# Patient Record
Sex: MALE | Race: WHITE | NOT HISPANIC OR LATINO | Employment: FULL TIME | URBAN - METROPOLITAN AREA
[De-identification: names, ages, dates, MRNs, and addresses within clinical notes are randomized per-mention and may not be internally consistent; named-entity substitution may affect disease eponyms.]

---

## 2017-01-23 ENCOUNTER — HOSPITAL ENCOUNTER (OUTPATIENT)
Dept: PAIN MANAGEMENT | Facility: REHABILITATION | Age: 27
End: 2017-01-23
Attending: ANESTHESIOLOGY
Payer: COMMERCIAL

## 2017-01-23 ENCOUNTER — HOSPITAL ENCOUNTER (OUTPATIENT)
Dept: RADIOLOGY | Facility: REHABILITATION | Age: 27
End: 2017-01-23
Attending: ANESTHESIOLOGY
Payer: COMMERCIAL

## 2017-01-23 VITALS
WEIGHT: 215.83 LBS | DIASTOLIC BLOOD PRESSURE: 97 MMHG | SYSTOLIC BLOOD PRESSURE: 142 MMHG | TEMPERATURE: 97.6 F | OXYGEN SATURATION: 95 % | HEART RATE: 68 BPM | BODY MASS INDEX: 30.22 KG/M2 | HEIGHT: 71 IN | RESPIRATION RATE: 18 BRPM

## 2017-01-23 PROCEDURE — 700111 HCHG RX REV CODE 636 W/ 250 OVERRIDE (IP)

## 2017-01-23 PROCEDURE — 64483 NJX AA&/STRD TFRM EPI L/S 1: CPT

## 2017-01-23 RX ORDER — IBUPROFEN 200 MG
200 TABLET ORAL EVERY 6 HOURS PRN
COMMUNITY
End: 2022-06-23

## 2017-01-23 RX ORDER — MIDAZOLAM HYDROCHLORIDE 1 MG/ML
INJECTION INTRAMUSCULAR; INTRAVENOUS
Status: COMPLETED
Start: 2017-01-23 | End: 2017-01-23

## 2017-01-23 RX ORDER — BUPIVACAINE HYDROCHLORIDE 2.5 MG/ML
INJECTION, SOLUTION EPIDURAL; INFILTRATION; INTRACAUDAL
Status: COMPLETED
Start: 2017-01-23 | End: 2017-01-23

## 2017-01-23 RX ORDER — DEXAMETHASONE SODIUM PHOSPHATE 10 MG/ML
INJECTION, SOLUTION INTRAMUSCULAR; INTRAVENOUS
Status: COMPLETED
Start: 2017-01-23 | End: 2017-01-23

## 2017-01-23 RX ORDER — NAPROXEN 250 MG/1
250 TABLET ORAL 2 TIMES DAILY WITH MEALS
COMMUNITY
End: 2022-06-23

## 2017-01-23 RX ADMIN — BUPIVACAINE HYDROCHLORIDE 2 ML: 2.5 INJECTION, SOLUTION EPIDURAL; INFILTRATION; INTRACAUDAL; PERINEURAL at 10:26

## 2017-01-23 RX ADMIN — DEXAMETHASONE SODIUM PHOSPHATE 10 MG: 10 INJECTION, SOLUTION INTRAMUSCULAR; INTRAVENOUS at 10:26

## 2017-01-23 ASSESSMENT — PAIN SCALES - GENERAL
PAINLEVEL_OUTOF10: 5
PAINLEVEL_OUTOF10: 8
PAINLEVEL_OUTOF10: 5

## 2017-01-23 NOTE — PROGRESS NOTES
Current meds. See medication reconciliation form. Reviewed with pt. Pt denies taking ASA,other blood thinners or anti-inflammatories. Pt has a ride post-procedure (mother, Promise is  if neccessary) otherwise pt is driving self. Printed and verbal discharge instructions given to pt who verbalized understanding.dr. Olguin made aware pre-procedure.

## 2018-12-03 ENCOUNTER — HOSPITAL ENCOUNTER (OUTPATIENT)
Dept: RADIOLOGY | Facility: MEDICAL CENTER | Age: 28
End: 2018-12-03
Attending: ANESTHESIOLOGY
Payer: COMMERCIAL

## 2018-12-03 DIAGNOSIS — M54.2 CERVICALGIA: ICD-10-CM

## 2018-12-03 DIAGNOSIS — M54.50 ACUTE LOW BACK PAIN WITHOUT SCIATICA, UNSPECIFIED BACK PAIN LATERALITY: ICD-10-CM

## 2018-12-03 PROCEDURE — 72040 X-RAY EXAM NECK SPINE 2-3 VW: CPT

## 2018-12-03 PROCEDURE — 72141 MRI NECK SPINE W/O DYE: CPT

## 2018-12-03 PROCEDURE — 72148 MRI LUMBAR SPINE W/O DYE: CPT

## 2018-12-03 PROCEDURE — 72100 X-RAY EXAM L-S SPINE 2/3 VWS: CPT

## 2019-06-17 ENCOUNTER — HOSPITAL ENCOUNTER (OUTPATIENT)
Facility: MEDICAL CENTER | Age: 29
End: 2019-06-17
Attending: PHYSICIAN ASSISTANT
Payer: COMMERCIAL

## 2019-06-17 ENCOUNTER — OFFICE VISIT (OUTPATIENT)
Dept: URGENT CARE | Facility: PHYSICIAN GROUP | Age: 29
End: 2019-06-17
Payer: COMMERCIAL

## 2019-06-17 VITALS
RESPIRATION RATE: 16 BRPM | WEIGHT: 220 LBS | DIASTOLIC BLOOD PRESSURE: 70 MMHG | SYSTOLIC BLOOD PRESSURE: 124 MMHG | OXYGEN SATURATION: 98 % | BODY MASS INDEX: 29.16 KG/M2 | TEMPERATURE: 97.1 F | HEIGHT: 73 IN | HEART RATE: 74 BPM
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
## 2019-06-17 DIAGNOSIS — B00.1 COLD SORE: ICD-10-CM

## 2019-06-17 DIAGNOSIS — K13.0 LESION OF LIP: Primary | ICD-10-CM

## 2019-06-17 PROCEDURE — 99203 OFFICE O/P NEW LOW 30 MIN: CPT | Mod: 25 | Performed by: PHYSICIAN ASSISTANT

## 2019-06-17 PROCEDURE — 87529 HSV DNA AMP PROBE: CPT | Mod: 91

## 2019-06-17 PROCEDURE — 90471 IMMUNIZATION ADMIN: CPT | Performed by: PHYSICIAN ASSISTANT

## 2019-06-17 PROCEDURE — 90715 TDAP VACCINE 7 YRS/> IM: CPT | Performed by: PHYSICIAN ASSISTANT

## 2019-06-17 RX ORDER — CELECOXIB 200 MG/1
200 CAPSULE ORAL
Refills: 5 | COMMUNITY
Start: 2019-04-28

## 2019-06-17 RX ORDER — BUPRENORPHINE 10 UG/H
PATCH TRANSDERMAL
Refills: 1 | COMMUNITY
Start: 2019-05-28 | End: 2022-06-23

## 2019-06-17 RX ORDER — VALACYCLOVIR HYDROCHLORIDE 1 G/1
1000 TABLET, FILM COATED ORAL 2 TIMES DAILY
Qty: 20 TAB | Refills: 0 | Status: SHIPPED | OUTPATIENT
Start: 2019-06-17 | End: 2022-06-23

## 2019-06-18 ASSESSMENT — ENCOUNTER SYMPTOMS
LIP LACERATION: 1
MYALGIAS: 0
ARTHRALGIAS: 0
FEVER: 0
SWOLLEN GLANDS: 1
HEADACHES: 0

## 2019-06-19 NOTE — PROGRESS NOTES
Subjective:      Silas Mckeon is a 28 y.o. male who presents with Hand Swelling (Lt hand animal bite  x3.5weeks ) and Lip Swelling (Upper lip wound/pus x3days )    PMH:  has a past medical history of Pain.  MEDS:   Current Outpatient Prescriptions:   •  celecoxib (CELEBREX) 200 MG Cap, Take 200 mg by mouth., Disp: , Rfl: 5  •  Buprenorphine 10 MCG/HR PATCH WEEKLY, APPLY 1 PATCH(ES) EVERY WEEK BY TRANSDERMAL ROUTE AS DIRECTED FOR 28 DAYS., Disp: , Rfl: 1  •  valacyclovir (VALTREX) 1 GM Tab, Take 1 Tab by mouth 2 times a day., Disp: 20 Tab, Rfl: 0  •  ibuprofen (MOTRIN) 200 MG Tab, Take 200 mg by mouth every 6 hours as needed., Disp: , Rfl:   •  naproxen (NAPROSYN) 250 MG Tab, Take 250 mg by mouth 2 times a day, with meals., Disp: , Rfl:   ALLERGIES: No Known Allergies  SURGHX:   Past Surgical History:   Procedure Laterality Date   • PB INJ,FORAMEN,L/S,1 LEVEL Bilateral 10/14/2015    Procedure: INJ-FORAMEN EPI LUM/SAC SNGL - L5-S1;  Surgeon: Chester Olguin M.D.;  [facility];  Service: Pain Management   • PB INJ,FORAMEN,L/S,1 LEVEL  10/14/2015    Procedure: INJ-FORAMEN EPI LUM/SAC SNGL;  Surgeon: Chester Olguin M.D.;  Location: [facility];  Service: Pain Management   • OTHER ORTHOPEDIC SURGERY      hip surgery 2010     SOCHX:  reports that he quit smoking about 4 years ago. He has never used smokeless tobacco. He reports that he drinks alcohol. He reports that he does not use drugs.  FH: Reviewed with patient, not pertinent to this visit.           Pt here for 2 concerns today:    First complaint, pt is requesting a Tdap due to being bitten by a pet sugar squirrel 3 weeks ago. Pt keeps squirrel as a pet.  He was holding the squirrel and it bit him because it felt threatened by the patient's dogs.  Pt states the bite is completely healed and was never infected, he just wanted to be sure his Tdap was updated, he cant remember his las Tdap     Second complaint: pt has blister  "on his upper lip that is tender and swollen x 3 days,  Pt has not had one before. Pt would like it tested for herpes.       Animal Bite   This is a new problem. Episode onset: 3 weeks. The problem has been resolved. Associated symptoms include swollen glands. Pertinent negatives include no arthralgias, fever, headaches, myalgias or rash. Nothing aggravates the symptoms. He has tried nothing for the symptoms. The treatment provided no relief.   Oral Pain   This is a new problem. The current episode started yesterday. The problem occurs constantly. The problem has been gradually worsening. Associated symptoms include swollen glands. Pertinent negatives include no arthralgias, fever, headaches, myalgias or rash. The symptoms are aggravated by drinking and eating. He has tried nothing for the symptoms. The treatment provided no relief.       Review of Systems   Constitutional: Negative for fever.   Musculoskeletal: Negative for arthralgias and myalgias.   Skin: Negative for rash.   Neurological: Negative for headaches.   All other systems reviewed and are negative.         Objective:     /70   Pulse 74   Temp 36.2 °C (97.1 °F) (Temporal)   Resp 16   Ht 1.854 m (6' 1\")   Wt 99.8 kg (220 lb)   SpO2 98%   BMI 29.03 kg/m²      Physical Exam   Constitutional: He is oriented to person, place, and time. He appears well-developed and well-nourished. No distress.   HENT:   Head: Normocephalic and atraumatic.   Right Ear: Tympanic membrane normal.   Left Ear: Tympanic membrane normal.   Nose: Nose normal.   Mouth/Throat: Uvula is midline, oropharynx is clear and moist and mucous membranes are normal. Oral lesions present. No posterior oropharyngeal erythema.       Eyes: Pupils are equal, round, and reactive to light. Conjunctivae and EOM are normal.   Neck: Normal range of motion. Neck supple.   Cardiovascular: Normal rate, regular rhythm and normal heart sounds.    Pulmonary/Chest: Effort normal and breath sounds " normal.   Abdominal: Soft.   Musculoskeletal: Normal range of motion.        Left hand: He exhibits no tenderness, normal two-point discrimination and no laceration. Normal sensation noted. Normal strength noted.        Hands:  Neurological: He is alert and oriented to person, place, and time. Gait normal.   Skin: Skin is warm and dry. Capillary refill takes less than 2 seconds.   Psychiatric: He has a normal mood and affect.   Nursing note and vitals reviewed.              Assessment/Plan:     1. Lesion of lip  HSV 1/2 PCR    valacyclovir (VALTREX) 1 GM Tab   2. Cold sore  valacyclovir (VALTREX) 1 GM Tab   3. Wound due to squirrel bite  Tdap =>6yo IM    valacyclovir (VALTREX) 1 GM Tab     Oral lesion cultured to test for HSV.     Tdap ordered for patient.      PT should follow up with PCP in 1-2 days for re-evaluation if symptoms have not improved.  Discussed red flags and reasons to return to UC or ED.  Pt and/or family verbalized understanding of diagnosis and follow up instructions and was offered informational handout on diagnosis.  PT discharged.

## 2019-06-21 LAB
HSV1 DNA SPEC QL NAA+PROBE: POSITIVE
HSV2 DNA SPEC QL NAA+PROBE: NEGATIVE
SPECIMEN SOURCE: ABNORMAL

## 2019-12-03 ENCOUNTER — OFFICE VISIT (OUTPATIENT)
Dept: URGENT CARE | Facility: PHYSICIAN GROUP | Age: 29
End: 2019-12-03
Payer: COMMERCIAL

## 2019-12-03 VITALS
BODY MASS INDEX: 30.27 KG/M2 | TEMPERATURE: 98.4 F | HEART RATE: 68 BPM | HEIGHT: 73 IN | WEIGHT: 228.4 LBS | DIASTOLIC BLOOD PRESSURE: 90 MMHG | SYSTOLIC BLOOD PRESSURE: 130 MMHG | OXYGEN SATURATION: 98 % | RESPIRATION RATE: 18 BRPM

## 2019-12-03 DIAGNOSIS — J20.8 ACUTE BRONCHITIS DUE TO OTHER SPECIFIED ORGANISMS: ICD-10-CM

## 2019-12-03 PROCEDURE — 99214 OFFICE O/P EST MOD 30 MIN: CPT | Performed by: NURSE PRACTITIONER

## 2019-12-03 RX ORDER — METHYLPREDNISOLONE 4 MG/1
TABLET ORAL
Qty: 21 TAB | Refills: 0 | Status: SHIPPED | OUTPATIENT
Start: 2019-12-03 | End: 2022-06-23

## 2019-12-03 RX ORDER — ALBUTEROL SULFATE 90 UG/1
2 AEROSOL, METERED RESPIRATORY (INHALATION) EVERY 6 HOURS PRN
Qty: 8.5 G | Refills: 0 | Status: SHIPPED | OUTPATIENT
Start: 2019-12-03 | End: 2022-06-23

## 2019-12-03 ASSESSMENT — ENCOUNTER SYMPTOMS
SORE THROAT: 0
EYE DISCHARGE: 0
CHILLS: 0
SHORTNESS OF BREATH: 0
COUGH: 1
FEVER: 0
DIARRHEA: 0
WHEEZING: 1
SPUTUM PRODUCTION: 1
MYALGIAS: 1
ORTHOPNEA: 0
HEADACHES: 1
NAUSEA: 0

## 2019-12-03 NOTE — PROGRESS NOTES
"Subjective:      Silas Mckeon is a 28 y.o. male who presents with Cough (fluid in lungs, sweats, fever, headache, )            HPI New. 28 year old with cough, \"fluid on lungs\", sweats and headache. Denies sore throat, nausea or diarrhea. States cough that is productive, no bloody sputum or chest pain/back pain. Denies shortness of breath but has some wheezing. He has been taking dayquil for his symptoms.  Patient has no known allergies.  Current Outpatient Medications on File Prior to Visit   Medication Sig Dispense Refill   • celecoxib (CELEBREX) 200 MG Cap Take 200 mg by mouth.  5   • Buprenorphine 10 MCG/HR PATCH WEEKLY APPLY 1 PATCH(ES) EVERY WEEK BY TRANSDERMAL ROUTE AS DIRECTED FOR 28 DAYS.  1   • ibuprofen (MOTRIN) 200 MG Tab Take 200 mg by mouth every 6 hours as needed.     • valacyclovir (VALTREX) 1 GM Tab Take 1 Tab by mouth 2 times a day. (Patient not taking: Reported on 12/3/2019) 20 Tab 0   • naproxen (NAPROSYN) 250 MG Tab Take 250 mg by mouth 2 times a day, with meals.       No current facility-administered medications on file prior to visit.      Social History     Socioeconomic History   • Marital status: Single     Spouse name: Not on file   • Number of children: Not on file   • Years of education: Not on file   • Highest education level: Not on file   Occupational History   • Not on file   Social Needs   • Financial resource strain: Not on file   • Food insecurity:     Worry: Not on file     Inability: Not on file   • Transportation needs:     Medical: Not on file     Non-medical: Not on file   Tobacco Use   • Smoking status: Former Smoker     Last attempt to quit: 2015     Years since quittin.6   • Smokeless tobacco: Never Used   Substance and Sexual Activity   • Alcohol use: Yes     Comment: occassion   • Drug use: No   • Sexual activity: Not on file   Lifestyle   • Physical activity:     Days per week: Not on file     Minutes per session: Not on file   • Stress: Not on file " "  Relationships   • Social connections:     Talks on phone: Not on file     Gets together: Not on file     Attends Sabianism service: Not on file     Active member of club or organization: Not on file     Attends meetings of clubs or organizations: Not on file     Relationship status: Not on file   • Intimate partner violence:     Fear of current or ex partner: Not on file     Emotionally abused: Not on file     Physically abused: Not on file     Forced sexual activity: Not on file   Other Topics Concern   • Not on file   Social History Narrative   • Not on file     Breast Cancer-related family history is not on file.      Review of Systems   Constitutional: Positive for malaise/fatigue. Negative for chills and fever.   HENT: Positive for congestion. Negative for sore throat.    Eyes: Negative for discharge.   Respiratory: Positive for cough, sputum production and wheezing. Negative for shortness of breath.    Cardiovascular: Negative for chest pain and orthopnea.   Gastrointestinal: Negative for diarrhea and nausea.   Musculoskeletal: Positive for myalgias.   Neurological: Positive for headaches.   Endo/Heme/Allergies: Negative for environmental allergies.          Objective:     /90 (BP Location: Right arm, Patient Position: Sitting, BP Cuff Size: Adult)   Pulse 68   Temp 36.9 °C (98.4 °F) (Temporal)   Resp 18   Ht 1.854 m (6' 1\")   Wt 103.6 kg (228 lb 6.4 oz)   SpO2 98%   BMI 30.13 kg/m²      Physical Exam  Vitals signs and nursing note reviewed.   Constitutional:       General: He is not in acute distress.     Appearance: He is well-developed.   HENT:      Head: Normocephalic and atraumatic.      Right Ear: Ear canal and external ear normal. No middle ear effusion. Tympanic membrane is not injected or perforated.      Left Ear: Ear canal and external ear normal.  No middle ear effusion. Tympanic membrane is not injected or perforated.      Nose: Mucosal edema present.      Mouth/Throat:      " Pharynx: Posterior oropharyngeal erythema present. No oropharyngeal exudate.   Eyes:      General:         Right eye: No discharge.         Left eye: No discharge.      Conjunctiva/sclera: Conjunctivae normal.   Neck:      Musculoskeletal: Normal range of motion and neck supple.   Cardiovascular:      Rate and Rhythm: Normal rate and regular rhythm.      Heart sounds: Normal heart sounds. No murmur.   Pulmonary:      Effort: Pulmonary effort is normal. No respiratory distress.      Breath sounds: Normal breath sounds.   Musculoskeletal: Normal range of motion.      Comments: Normal movement of all 4 extremities.   Lymphadenopathy:      Cervical: No cervical adenopathy.      Upper Body:      Right upper body: No supraclavicular adenopathy.      Left upper body: No supraclavicular adenopathy.   Skin:     General: Skin is warm and dry.   Neurological:      Mental Status: He is alert and oriented to person, place, and time.      Gait: Gait normal.   Psychiatric:         Behavior: Behavior normal.         Thought Content: Thought content normal.                 Assessment/Plan:     1. Acute bronchitis due to other specified organisms  albuterol 108 (90 Base) MCG/ACT Aero Soln inhalation aerosol    methylPREDNISolone (MEDROL DOSEPAK) 4 MG Tablet Therapy Pack     Viral illness at this time with no indication for antibiotics. Reviewed with patient expected course of illness and also reviewed OTC medications that may be used for symptom relief. Follow up 7-10 days if not improving.

## 2022-06-23 ENCOUNTER — OFFICE VISIT (OUTPATIENT)
Dept: URGENT CARE | Facility: PHYSICIAN GROUP | Age: 32
End: 2022-06-23
Payer: COMMERCIAL

## 2022-06-23 VITALS
DIASTOLIC BLOOD PRESSURE: 90 MMHG | BODY MASS INDEX: 32.87 KG/M2 | WEIGHT: 248 LBS | OXYGEN SATURATION: 97 % | RESPIRATION RATE: 18 BRPM | HEIGHT: 73 IN | HEART RATE: 77 BPM | SYSTOLIC BLOOD PRESSURE: 136 MMHG | TEMPERATURE: 98.4 F

## 2022-06-23 DIAGNOSIS — H60.332 ACUTE SWIMMER'S EAR OF LEFT SIDE: ICD-10-CM

## 2022-06-23 DIAGNOSIS — H61.22 HEARING LOSS OF LEFT EAR DUE TO CERUMEN IMPACTION: ICD-10-CM

## 2022-06-23 PROCEDURE — 99213 OFFICE O/P EST LOW 20 MIN: CPT | Performed by: PHYSICIAN ASSISTANT

## 2022-06-23 RX ORDER — OFLOXACIN 3 MG/ML
5 SOLUTION AURICULAR (OTIC) DAILY
Qty: 7 ML | Refills: 0 | Status: SHIPPED | OUTPATIENT
Start: 2022-06-23 | End: 2022-06-30

## 2022-06-23 ASSESSMENT — ENCOUNTER SYMPTOMS
CHILLS: 0
FEVER: 0

## 2022-06-23 NOTE — PROGRESS NOTES
Subjective:   Silas Mckeon is a 31 y.o. male who presents today with   Chief Complaint   Patient presents with   • Ear Fullness     Water went in the ears while swimming. Left side muffled and ringing      Ear Fullness  This is a new problem. The current episode started yesterday. The problem occurs constantly. The problem has been unchanged. Pertinent negatives include no chills or fever. Treatments tried: Peroxide, drops, water. The treatment provided no relief.     Patient states he went swimming yesterday and noticed some muffled sounds and ear fullness to the left ear afterwards.  PMH:  has a past medical history of Pain.  MEDS:   Current Outpatient Medications:   •  ofloxacin otic sol (FLOXIN OTIC) 0.3 % Solution, Administer 5 Drops into affected ear(s) every day for 7 days., Disp: 7 mL, Rfl: 0  •  celecoxib (CELEBREX) 200 MG Cap, Take 200 mg by mouth., Disp: , Rfl: 5  ALLERGIES: No Known Allergies  SURGHX:   Past Surgical History:   Procedure Laterality Date   • [place] NJX AA&/STRD TFRML EPI LUMBAR/SACRAL 1 LEVEL Bilateral 10/14/2015    Procedure: INJ-FORAMEN EPI LUM/SAC SNGL - L5-S1;  Surgeon: Chester Olguin M.D.;  Location: [facility];  Service: Pain Management   • NH NJX AA&/STRD TFRML EPI LUMBAR/SACRAL 1 LEVEL  10/14/2015    Procedure: INJ-FORAMEN EPI LUM/SAC SNGL;  Surgeon: Chester Olguin M.D.;  Location: SURGERY SURG[facility];  Service: Pain Management   • OTHER ORTHOPEDIC SURGERY      hip surgery 2010     SOCHX:  reports that he quit smoking about 7 years ago. He has never used smokeless tobacco. He reports current alcohol use. He reports that he does not use drugs.  FH: Reviewed with patient, not pertinent to this visit.     Review of Systems   Constitutional: Negative for chills and fever.   HENT:        Left ear fullness      Objective:   BP (!) 136/90 (BP Location: Right arm, Patient Position: Sitting, BP Cuff Size: Adult)   Pulse 77   Temp 36.9 °C (98.4 °F) (Temporal)    "Resp 18   Ht 1.854 m (6' 1\")   Wt 112 kg (248 lb)   SpO2 97%   BMI 32.72 kg/m²   Physical Exam  Vitals and nursing note reviewed.   Constitutional:       General: He is not in acute distress.     Appearance: Normal appearance. He is well-developed. He is not ill-appearing or toxic-appearing.   HENT:      Head: Normocephalic and atraumatic.      Right Ear: Hearing, tympanic membrane and ear canal normal.      Left Ear: Hearing normal. No drainage, swelling or tenderness. There is impacted cerumen.   Cardiovascular:      Rate and Rhythm: Normal rate.   Pulmonary:      Effort: Pulmonary effort is normal.   Musculoskeletal:      Comments: Normal movement in all 4 extremities   Skin:     General: Skin is warm and dry.   Neurological:      Mental Status: He is alert.      Coordination: Coordination normal.   Psychiatric:         Mood and Affect: Mood normal.       Assessment/Plan:   Assessment    1. Hearing loss of left ear due to cerumen impaction    2. Acute swimmer's ear of left side  - ofloxacin otic sol (FLOXIN OTIC) 0.3 % Solution; Administer 5 Drops into affected ear(s) every day for 7 days.  Dispense: 7 mL; Refill: 0  Patient tolerated ear lavage with successful removal of large amounts of cerumen from the left ear.  Was able to visualize the TM following ear lavage and no signs of perforation but the eardrum was inflamed as was the ear canal.   We will treat with antibiotic eardrops at this time and recommend patient avoiding any water such as from swimming while being treated.  Differential diagnosis, natural history, supportive care, and indications for immediate follow-up discussed.   Patient given instructions and understanding of medications and treatment.    If not improving in 3-5 days, F/U with PCP or return to  if symptoms worsen.    Patient agreeable to plan.      Please note that this dictation was created using voice recognition software. I have made every reasonable attempt to correct obvious " errors, but I expect that there are errors of grammar and possibly content that I did not discover before finalizing the note.    Shai Faith PA-C

## 2022-09-16 ENCOUNTER — HOSPITAL ENCOUNTER (OUTPATIENT)
Dept: RADIOLOGY | Facility: MEDICAL CENTER | Age: 32
End: 2022-09-16
Payer: COMMERCIAL

## 2022-09-16 ENCOUNTER — OFFICE VISIT (OUTPATIENT)
Dept: URGENT CARE | Facility: PHYSICIAN GROUP | Age: 32
End: 2022-09-16
Payer: COMMERCIAL

## 2022-09-16 VITALS
WEIGHT: 235 LBS | TEMPERATURE: 97.2 F | SYSTOLIC BLOOD PRESSURE: 136 MMHG | HEIGHT: 73 IN | BODY MASS INDEX: 31.14 KG/M2 | RESPIRATION RATE: 16 BRPM | OXYGEN SATURATION: 98 % | HEART RATE: 112 BPM | DIASTOLIC BLOOD PRESSURE: 78 MMHG

## 2022-09-16 DIAGNOSIS — R06.02 SHORTNESS OF BREATH: ICD-10-CM

## 2022-09-16 DIAGNOSIS — R06.83 SNORING: ICD-10-CM

## 2022-09-16 DIAGNOSIS — F41.9 ANXIETY: ICD-10-CM

## 2022-09-16 PROCEDURE — 99214 OFFICE O/P EST MOD 30 MIN: CPT

## 2022-09-16 PROCEDURE — 71046 X-RAY EXAM CHEST 2 VIEWS: CPT

## 2022-09-16 PROCEDURE — 93000 ELECTROCARDIOGRAM COMPLETE: CPT

## 2022-09-16 RX ORDER — HYDROXYZINE HYDROCHLORIDE 25 MG/1
25 TABLET, FILM COATED ORAL
Qty: 30 TABLET | Refills: 1 | Status: SHIPPED | OUTPATIENT
Start: 2022-09-16 | End: 2022-10-16

## 2022-09-16 RX ORDER — ALBUTEROL SULFATE 90 UG/1
2 AEROSOL, METERED RESPIRATORY (INHALATION) EVERY 6 HOURS PRN
Qty: 8.5 G | Refills: 0 | Status: SHIPPED | OUTPATIENT
Start: 2022-09-16 | End: 2023-07-26

## 2022-09-16 NOTE — PROGRESS NOTES
"Subjective:   Silas Mckeon is a 31 y.o. male who presents for Anxiety (Woke up today in a panic attack, heart racing, pt thinks he has sleep apnea. )      HPI:  Presents with his SO with complaints of nocturnal awakening, racing heart, anxiety attacks.   Reports \"racing heart sensation for years,\" SOB with activity, awakens gasping frequently. His SO reports witnessed snoring.  Has taken SO's gabapentin 100 mg, hydroxyzine 50 mg, buspar 10 mg prior to arrival at urgent care for \"panic attack.\"  Denies CP, dizziness, palpitations, leg edema, confusion. He reports alcohol use 6-8+ drinks per night, use of marijuana via \"vaping and bongs\". Denies tobacco use.   Denies SI/HI.    ROS as above per HPI    Medications:    Current Outpatient Medications on File Prior to Visit   Medication Sig Dispense Refill    celecoxib (CELEBREX) 200 MG Cap Take 200 mg by mouth.  5     No current facility-administered medications on file prior to visit.        Allergies:   Patient has no known allergies.    Problem List:   Patient Active Problem List   Diagnosis    Left lumbosacral radiculopathy        Surgical History:  Past Surgical History:   Procedure Laterality Date    IN NJX AA&/STRD TFRML EPI LUMBAR/SACRAL 1 LEVEL Bilateral 10/14/2015    Procedure: INJ-FORAMEN EPI LUM/SAC SNGL - L5-S1;  Surgeon: Chester Olguin M.D.;  [facility];  Service: Pain Management    IN NJX AA&/STRD TFRML EPI LUMBAR/SACRAL 1 LEVEL  10/14/2015    Procedure: INJ-FORAMEN EPI LUM/SAC SNGL;  Surgeon: Chester Olguin M.D.;  Location: [facility];  Service: Pain Management    OTHER ORTHOPEDIC SURGERY      hip surgery        Past Social Hx:   Social History     Tobacco Use    Smoking status: Former     Types: Cigarettes     Quit date: 2015     Years since quittin.4    Smokeless tobacco: Never   Substance Use Topics    Alcohol use: Yes     Comment: occassion    Drug use: No          Problem list, medications, " "and allergies reviewed by myself today in Epic.     Objective:     /78 (BP Location: Right arm, Patient Position: Sitting, BP Cuff Size: Adult)   Pulse (!) 112   Temp 36.2 °C (97.2 °F) (Temporal)   Resp 16   Ht 1.854 m (6' 1\")   Wt 107 kg (235 lb)   SpO2 98%   BMI 31.00 kg/m²     Physical Exam  Vitals and nursing note reviewed.   Constitutional:       General: He is not in acute distress.     Appearance: Normal appearance. He is not ill-appearing or diaphoretic.   HENT:      Head: Normocephalic and atraumatic.   Eyes:      Conjunctiva/sclera: Conjunctivae normal.   Cardiovascular:      Rate and Rhythm: Normal rate and regular rhythm.      Heart sounds: Normal heart sounds.   Pulmonary:      Effort: Pulmonary effort is normal.      Breath sounds: Examination of the right-middle field reveals decreased breath sounds. Examination of the left-middle field reveals decreased breath sounds. Examination of the right-lower field reveals decreased breath sounds. Examination of the left-lower field reveals decreased breath sounds. Decreased breath sounds present. No wheezing, rhonchi or rales.   Abdominal:      General: Abdomen is flat. Bowel sounds are normal. There is no distension.      Palpations: Abdomen is soft. There is no mass.      Tenderness: There is no abdominal tenderness. There is no right CVA tenderness, left CVA tenderness, guarding or rebound.      Hernia: No hernia is present.   Skin:     General: Skin is warm and dry.      Capillary Refill: Capillary refill takes less than 2 seconds.      Findings: No rash.   Neurological:      Mental Status: He is alert and oriented to person, place, and time.     DX-CHEST-2 VIEWS 09/16/2022    Narrative  9/16/2022 11:32 AM    HISTORY/REASON FOR EXAM:  Shortness of Breath  Cough, fatigue.    TECHNIQUE/EXAM DESCRIPTION AND NUMBER OF VIEWS:  Two views of the chest.    COMPARISON:  None available.    FINDINGS:  Cardiomediastinal contour is within normal " limits.  No focal pulmonary consolidation.  No pleural fluid collection or pneumothorax.  No major bony abnormality is seen.    Impression  No acute cardiopulmonary disease.    Assessment/Plan:     Diagnosis and associated orders:   1. Shortness of breath  - EKG - Clinic Performed  - DX-CHEST-2 VIEWS; Future  - albuterol 108 (90 Base) MCG/ACT Aero Soln inhalation aerosol; Inhale 2 Puffs every 6 hours as needed for Shortness of Breath.  Dispense: 8.5 g; Refill: 0    2. Anxiety  - hydrOXYzine HCl (ATARAX) 25 MG Tab; Take 1 Tablet by mouth at bedtime as needed for Anxiety (allergies) for up to 30 days.  Dispense: 30 Tablet; Refill: 1  - Referral to Behavioral Health    3. Snoring  - Referral to Pulmonary and Sleep Medicine      Comments/MDM:       Patient presents with concerns of anxiety attack. Chest xray and EKG obtained due to report of shortness of breath and racing heart. Chest xray is negative for acute cardiopulmonary disease. EKG is normal sinus rhythm, no ST segment elevation. Tracing uploaded to chart. Referrals placed to behavioral health and pulmonary/sleep medicine. Referral process explained to patient. Supportive measures encouraged. Patient verbalized understanding and consented to plan of care.     Pt is clinically stable at today's acute urgent care visit.  No acute distress noted. Appropriate for outpatient management at this time.       Discussed DDx, management options (risks,benefits, and alternatives to planned treatment), natural progression and supportive care.  Expressed understanding and the treatment plan was agreed upon. Questions were encouraged and answered   Return to urgent care prn if new or worsening sx or if there is no improvement in condition prn.    Educated in Red flags and indications to immediately call 911 or present to the Emergency Department.   Advised the patient to follow-up with the primary care physician for recheck, reevaluation, and consideration of further  management.      Please note that this dictation was created using voice recognition software. I have made a reasonable attempt to correct obvious errors, but I expect that there are errors of grammar and possibly content that I did not discover before finalizing the note.    This note was electronically signed by Sachi Zuleta DNP

## 2022-10-06 ENCOUNTER — HOSPITAL ENCOUNTER (OUTPATIENT)
Dept: LAB | Facility: MEDICAL CENTER | Age: 32
End: 2022-10-06
Attending: FAMILY MEDICINE
Payer: COMMERCIAL

## 2022-10-06 LAB
ALBUMIN SERPL BCP-MCNC: 4.9 G/DL (ref 3.2–4.9)
ALBUMIN/GLOB SERPL: 2 G/DL
ALP SERPL-CCNC: 39 U/L (ref 30–99)
ALT SERPL-CCNC: 52 U/L (ref 2–50)
ANION GAP SERPL CALC-SCNC: 10 MMOL/L (ref 7–16)
AST SERPL-CCNC: 29 U/L (ref 12–45)
BILIRUB SERPL-MCNC: 0.5 MG/DL (ref 0.1–1.5)
BUN SERPL-MCNC: 13 MG/DL (ref 8–22)
CALCIUM SERPL-MCNC: 9.4 MG/DL (ref 8.5–10.5)
CHLORIDE SERPL-SCNC: 103 MMOL/L (ref 96–112)
CHOLEST SERPL-MCNC: 181 MG/DL (ref 100–199)
CO2 SERPL-SCNC: 25 MMOL/L (ref 20–33)
CREAT SERPL-MCNC: 1.08 MG/DL (ref 0.5–1.4)
FASTING STATUS PATIENT QL REPORTED: NORMAL
GFR SERPLBLD CREATININE-BSD FMLA CKD-EPI: 93 ML/MIN/1.73 M 2
GLOBULIN SER CALC-MCNC: 2.4 G/DL (ref 1.9–3.5)
GLUCOSE SERPL-MCNC: 117 MG/DL (ref 65–99)
HDLC SERPL-MCNC: 43 MG/DL
LDLC SERPL CALC-MCNC: 123 MG/DL
POTASSIUM SERPL-SCNC: 4.1 MMOL/L (ref 3.6–5.5)
PROT SERPL-MCNC: 7.3 G/DL (ref 6–8.2)
SODIUM SERPL-SCNC: 138 MMOL/L (ref 135–145)
T4 FREE SERPL-MCNC: 1.16 NG/DL (ref 0.93–1.7)
TRIGL SERPL-MCNC: 75 MG/DL (ref 0–149)
TSH SERPL DL<=0.005 MIU/L-ACNC: 1.86 UIU/ML (ref 0.38–5.33)

## 2022-10-06 PROCEDURE — 80061 LIPID PANEL: CPT

## 2022-10-06 PROCEDURE — 80053 COMPREHEN METABOLIC PANEL: CPT

## 2022-10-06 PROCEDURE — 36415 COLL VENOUS BLD VENIPUNCTURE: CPT

## 2022-10-06 PROCEDURE — 84443 ASSAY THYROID STIM HORMONE: CPT

## 2022-10-06 PROCEDURE — 84439 ASSAY OF FREE THYROXINE: CPT

## 2023-01-16 ENCOUNTER — TELEPHONE (OUTPATIENT)
Dept: SLEEP MEDICINE | Facility: MEDICAL CENTER | Age: 33
End: 2023-01-16
Payer: COMMERCIAL

## 2023-01-16 NOTE — TELEPHONE ENCOUNTER
SLEEP - NEW PATIENT CHART PREP COMPLETED ON 01/16/2023    SCHEDULED FOLLOW UP 01/20/2023    Ref by ESTHER Zuleta D.N.P. Dx: Snoring , Frequent nocturnal awakening  3. Snoring  - Referral to Pulmonary and Sleep Medicine     Referring OV Notes Reviewed : YES     Is Pt currently on PAP? NO .If yes, contact patient as a remind to bring device with them.     Nocturnal Oxygen :NO . If yes, Liter Flow? N/A    Any prior Sleep Studies on file? NO. If yes, when? N/A    Previously seen with [facility]? NO If yes, when? N/A    Previous PMA Patient? NO

## 2023-01-23 ENCOUNTER — TELEPHONE (OUTPATIENT)
Dept: SLEEP MEDICINE | Facility: MEDICAL CENTER | Age: 33
End: 2023-01-23
Payer: COMMERCIAL

## 2023-01-23 NOTE — TELEPHONE ENCOUNTER
01-23-23  1st NO SHOW  Date of No Show: 01-20-23  Provider: Carin  Reason For Visit: NP/ Sleep   Outcome of call:    Appt rescheduled with patient   Reason Missed: forgot  ACM

## 2023-01-25 ENCOUNTER — TELEPHONE (OUTPATIENT)
Dept: SLEEP MEDICINE | Facility: MEDICAL CENTER | Age: 33
End: 2023-01-25
Payer: COMMERCIAL

## 2023-01-25 NOTE — TELEPHONE ENCOUNTER
01-24-23  2nd NO SHOW  Date of No Show: 01-24-23  Provider: Carin  Reason For Visit: NP/Sleep  Outcome of call:    Pt called to reschedule same day/ no call made  Reason Missed: car broke down  ACM

## 2023-06-06 ENCOUNTER — OFFICE VISIT (OUTPATIENT)
Dept: DERMATOLOGY | Facility: IMAGING CENTER | Age: 33
End: 2023-06-06
Payer: COMMERCIAL

## 2023-06-06 DIAGNOSIS — D48.9 NEOPLASM OF UNCERTAIN BEHAVIOR: ICD-10-CM

## 2023-06-06 DIAGNOSIS — L91.8 ACROCHORDON: ICD-10-CM

## 2023-06-06 PROCEDURE — 11102 TANGNTL BX SKIN SINGLE LES: CPT | Performed by: NURSE PRACTITIONER

## 2023-06-06 PROCEDURE — 99213 OFFICE O/P EST LOW 20 MIN: CPT | Mod: 25 | Performed by: NURSE PRACTITIONER

## 2023-06-06 RX ORDER — FLUOXETINE HYDROCHLORIDE 20 MG/1
CAPSULE ORAL
COMMUNITY
Start: 2023-04-13

## 2023-06-06 RX ORDER — FLUOXETINE HYDROCHLORIDE 20 MG/1
CAPSULE ORAL
COMMUNITY
Start: 2022-12-01 | End: 2023-06-06

## 2023-06-06 RX ORDER — HYDROXYZINE 50 MG/1
TABLET, FILM COATED ORAL
COMMUNITY
Start: 2023-04-20

## 2023-06-06 RX ORDER — LORAZEPAM 0.5 MG/1
TABLET ORAL
COMMUNITY
Start: 2023-05-27

## 2023-06-06 NOTE — PROGRESS NOTES
DERMATOLOGY NOTE  NEW VISIT       Chief complaint: Establish Care   Pt used a de greaser at work and thinks it was from that.  Possible skin tag left lower abdomen    HPI/location: Rt cheek  Time present: Approx 4yrs  Painful lesion: No  Itching lesion: No  Enlarging lesion: Yes      History of skin cancer: No  History of precancers/actinic keratoses: No  History of biopsies:No  History of blistering/severe sunburns:No  Family history of skin cancer:No  Family history of atypical moles:No      No Known Allergies     MEDICATIONS:  Medications relevant to specialty reviewed.     REVIEW OF SYSTEMS:   Positive for skin (see HPI)  Negative for fevers and chills       EXAM:  There were no vitals taken for this visit.  Constitutional: Well-developed, well-nourished, and in no distress.     A focused skin exam was performed including the affected areas of the face, abdomen. Notable findings on exam today listed below and/or in assessment/plan.     3 mm light brown papule to R cheek  1-2mm skin-colored to hyperpigmented, soft, pedunculated papule to L low abdomen      IMPRESSION / PLAN:    1. Neoplasm of uncertain behavior  Procedure Note   Procedure: Biopsy by shave technique  Location: R cheek  Size: as noted in exam  Preoperative diagnosis:r/o atypia  Risks, benefits and alternatives of procedure discussed, verbal consent obtained for photo (see chart) and written informed consent obtained for procedure. Time out completed. Area of biopsy prepped with alcohol. Anesthesia with 1% lidocaine with epinephrine administered with 30 gauge needle. Shave biopsy of the site performed. Hemostasis achieved with pressure and aluminum chloride. Vaseline applied to wound with bandage. Patient tolerated procedure well and there were no complications. The specimen was sent to the pathology lab by the staff. Wound care was discussed.      2. Acrochordon  - Benign-appearing nature of lesions discussed during exam.   - Advised to continue to  monitor for any return to clinic for new or concerning changes.        Discussed risks associated with shave bx, Patient verbalized understanding and agrees with plan regarding the above          Please note that this dictation was created using voice recognition software. I have made every reasonable attempt to correct obvious errors, but I expect that there are errors of grammar and possibly content that I did not discover before finalizing the note.      Return to clinic in: Return for Pending Bx results. and as needed for any new or changing skin lesions.

## 2023-06-13 ENCOUNTER — TELEPHONE (OUTPATIENT)
Dept: DERMATOLOGY | Facility: IMAGING CENTER | Age: 33
End: 2023-06-13
Payer: COMMERCIAL

## 2023-06-13 NOTE — TELEPHONE ENCOUNTER
Left message informing patient of D-Path results Bx right cheek benign . No further treatment is needed , scanned in media tab

## 2023-07-26 ENCOUNTER — OFFICE VISIT (OUTPATIENT)
Dept: SLEEP MEDICINE | Facility: MEDICAL CENTER | Age: 33
End: 2023-07-26
Attending: STUDENT IN AN ORGANIZED HEALTH CARE EDUCATION/TRAINING PROGRAM
Payer: COMMERCIAL

## 2023-07-26 VITALS
DIASTOLIC BLOOD PRESSURE: 78 MMHG | HEART RATE: 67 BPM | HEIGHT: 73 IN | RESPIRATION RATE: 16 BRPM | BODY MASS INDEX: 33 KG/M2 | OXYGEN SATURATION: 97 % | SYSTOLIC BLOOD PRESSURE: 126 MMHG | WEIGHT: 249 LBS

## 2023-07-26 DIAGNOSIS — F41.9 ANXIETY: ICD-10-CM

## 2023-07-26 DIAGNOSIS — R53.83 FATIGUE, UNSPECIFIED TYPE: ICD-10-CM

## 2023-07-26 DIAGNOSIS — F51.04 CHRONIC INSOMNIA: ICD-10-CM

## 2023-07-26 DIAGNOSIS — G47.33 OSA (OBSTRUCTIVE SLEEP APNEA): Primary | ICD-10-CM

## 2023-07-26 PROCEDURE — 3078F DIAST BP <80 MM HG: CPT | Performed by: STUDENT IN AN ORGANIZED HEALTH CARE EDUCATION/TRAINING PROGRAM

## 2023-07-26 PROCEDURE — 99204 OFFICE O/P NEW MOD 45 MIN: CPT | Performed by: STUDENT IN AN ORGANIZED HEALTH CARE EDUCATION/TRAINING PROGRAM

## 2023-07-26 PROCEDURE — 99212 OFFICE O/P EST SF 10 MIN: CPT | Performed by: STUDENT IN AN ORGANIZED HEALTH CARE EDUCATION/TRAINING PROGRAM

## 2023-07-26 PROCEDURE — 3074F SYST BP LT 130 MM HG: CPT | Performed by: STUDENT IN AN ORGANIZED HEALTH CARE EDUCATION/TRAINING PROGRAM

## 2023-07-26 RX ORDER — TRAZODONE HYDROCHLORIDE 100 MG/1
TABLET ORAL
COMMUNITY
Start: 2023-07-14

## 2023-07-26 ASSESSMENT — PATIENT HEALTH QUESTIONNAIRE - PHQ9: CLINICAL INTERPRETATION OF PHQ2 SCORE: 0

## 2023-07-26 NOTE — PATIENT INSTRUCTIONS
"    It was a pleasure meeting you today. Here are a list of resources for self-guided CTBI.    CBT-I Books  Quinn Mind: Turn Off Your Noisy Thoughts and Get a Good Night's Sleep - Deya Villanueva,  and Laura Osborne Phd  Accessible, enjoyable, and grounded in evidence-based cognitive behavioral therapy (CBT), Quinn Mind directly addresses the effects of rumination?or having an overactive brain?on your ability to sleep well. Written by two psychologists who specialize in sleep disorders, the book contains helpful exercises and insights into how you can better manage your thoughts at bedtime, and finally get some sleep.    Insomnia Solved: A Self-Directed Cognitive Behavioral Therapy for Insomnia (CBTI) Program - Moi Streeter MD  Cognitive behavioral therapy for insomnia (CBTI) is often structured as a 6-week treatment program that can help people who have difficulty falling asleep, staying asleep, or find that sleep is unrefreshing. CBTI is scientifically proven, highly effective, and does not rely on medications. CBTI has life-long benefits and most participants report improved sleep satisfaction. Insomnia Solved is based on the core features of this treatment.    Quiet Your Mind and Get to Sleep: Solutions to Insomnia for Those with Depression, Anxiety or Chronic Pain - Laura Obsorne, PhD  This workbook uses cognitive behavior therapy, which has been shown to work as well as sleep medications and produce longer-lasting effects. Research shows that it also works well for those whose insomnia is experienced in the context of anxiety, depression, and chronic pain. The complete program in this book goes to the root of your insomnia and offers the same techniques used by experienced sleep specialists.    \"Say Quinn to Insomnia\" by Jose Antonio Alexis     \"No More Sleepless Nights\" by Hector Marino    CBT-I Online Resources  Path to Better Sleep (free) - https://www.veterantraining.va.gov/insomnia/index.asp " "  This free online Cognitive Behavioral Therapy for Insomnia course, which was developed for veterans, takes you through a sleep \"check-in\" and the various components of CBT-I, including modifying unhelpful behaviors and unhelpful thoughts around sleep.    Insomnia Solved - Florentin Streeter MD ($79) - http://www.florentinTribe.[employer]ptivity/fix-my-insomnia/   Insomnia Solved is a self-guided CBTI program created by Florentin Streeter M.D., a board-certified medical doctor, that is priced inexpensively at $79. The complete program includes full access to exclusive multimedia content, including the 154-page eBook and online modules and audiofiles.    News360 - Nadja Cassidy, PhD, [facility] ($129) - https://BioConsortia/   Sleepfitness is an insomnia program based on Cognitive Behavioral Treatment for Insomnia (CBTi) and is a 6-week self-guided online format. IT costs $219 for a 1-year subscription. You can sign up for the 7-day free trial and learn how CBTi can improve your sleep.    Apps  Insomnia  (free)   Offers a self-guided 5-week training plan designed to change sleep habits.  https://[employer].va.gov/arina/insomnia-      "

## 2023-07-26 NOTE — PROGRESS NOTES
[facility] Consult Note     Date: 7/26/2023 / Time: 12:40 PM      Thank you for requesting a sleep medicine consultation on Silas Mckeon at the sleep center. Presents today with the chief complaints of unrefreshing sleep, daytime fatigue and history of of sleep apnea. He is referred by [facility]  [address]  [place] [address] for evaluation and treatment of s obstructive sleep apnea.    HISTORY OF PRESENT ILLNESS:     Silas Mckeon is a 32 y.o. male with anxiety, daytime fatigue, obesity and obstructive sleep apnea.  Presents to Sleep Clinic for evaluation of sleep.     He states approximately 6 months ago he underwent a sleep study through [facility].  The study was a home sleep study.  This showed a bradycardia events an hour of 14.  Following study he was placed on auto CPAP at home.  He states at that time he was drinking alcohol more.  With getting the CPAP machine he had difficulty using it.  He states that he was able to use it 1 night for the majority the night and did not notice a significant difference in his quality of sleep.  However otherwise he takes the mask off in the night unintentionally.    He does have trouble with sleep initiation.  He is taking trazodone, lorazepam, and hydroxyzine at night and he takes them 1-1/2 hours to fall asleep.  He states majority of his life he has been a night owl.  He will stay up late and sleeping.    He did receive his CPAP machine for [facility].    As per supplemental questionnaire to be scanned or imported into chart:    Laurens Sleepiness Score: 0    Sleep Schedule  Bedtime: Weekday 1-2am Weekend same  Wake time: Weekday 9-10am Weekend same  Sleep-onset latency: takes hydroxyzine and lorazepam and trazodone, 1.5 hours   Awakenings from sleep: 0-2  Difficulty falling back asleep: sometimes   Bedroom partner: Yes  Naps: No     DAYTIME SYMPTOMS:   Excessive daytime sleepiness: No  "  Daytime fatigue: Yes  Difficulty concentrating: Yes sometimes  Memory problems: Yes sometimes   Irritability:No   Work/school performance issues: Yes Tired at times   Sleepiness with driving: No   Caffeine/stimulant use: Yes  Alcohol use:No stopped a few months ago      SLEEP RELATED SYMPTOMS  Snoring: Yes only with drinking   Witnessed apnea or gasping/choking: Yes with alcohol   Dry mouth or mouth breathing: Yes  Sweating: Yes  Teeth grinding/biting: No   Morning headaches: No   Refreshed Upon Awakening: No      SLEEP RELATED BEHAVIORS:  Parasomnias (walking, talking, eating, violence): No   Leg kicking: No   Restless legs - \"urge to move\": No   Nightmares: No  Recurrent: No   Dream enactment: No      NARCOLEPSY:  Cataplexy: No   Sleep paralysis: No   Sleep attacks: No   Hypnagogic/hypnopompic hallucinations: No     MEDICAL HISTORY  Past Medical History:   Diagnosis Date    Pain         SURGICAL HISTORY  Past Surgical History:   Procedure Laterality Date    HI NJX AA&/STRD TFRML EPI LUMBAR/SACRAL 1 LEVEL Bilateral 10/14/2015    Procedure: INJ-FORAMEN EPI LUM/SAC SNGL - L5-S1;  Surgeon: Chester Olguin M.D.;  Location: SURGERY SURGICAL [facility];  Service: Pain Management    HI NJX AA&/STRD TFRML EPI LUMBAR/SACRAL 1 LEVEL  10/14/2015    Procedure: INJ-FORAMEN EPI LUM/SAC SNGL;  Surgeon: Chester Olguin M.D.;  Location: SURGERY SURGICAL [facility];  Service: Pain Management    OTHER ORTHOPEDIC SURGERY      hip surgery         FAMILY HISTORY  History reviewed. No pertinent family history.    SOCIAL HISTORY  Social History     Socioeconomic History    Marital status: Single   Tobacco Use    Smoking status: Former     Types: Cigarettes     Quit date: 2015     Years since quittin.2    Smokeless tobacco: Never   Vaping Use    Vaping Use: Never used   Substance and Sexual Activity    Alcohol use: Not Currently     Comment: occassion    Drug use: No        Occupation: Sales and business with trash management " "    CURRENT MEDICATIONS  Current Outpatient Medications   Medication Sig Dispense Refill    FLUoxetine (PROZAC) 20 MG Cap TAKE 1 TABLET ORAL EVERY AM OR DEPRESSION/ANXIETY DISP #30 TABS FOR 30 DAYS      LORazepam (ATIVAN) 0.5 MG Tab TAKE 1 TABLET BY MOUTH EVERY DAY AS NEEDED FOR ANXIETY FOR 30 DAYS      celecoxib (CELEBREX) 200 MG Cap Take 200 mg by mouth.  5    traZODone (DESYREL) 100 MG Tab       hydrOXYzine HCl (ATARAX) 50 MG Tab TAKE 1 TABLET ORAL DAILY AS NEEDED FOR ANXIETY DISP#30 TABS FOR 30 DAYS (Patient not taking: Reported on 6/6/2023)       No current facility-administered medications for this visit.       REVIEW OF SYSTEMS  Constitutional: Denies fevers, Denies weight changes  Ears/Nose/Throat/Mouth: Denies nasal congestion or sore throat   Cardiovascular: Denies chest pain  Respiratory: Denies shortness of breath, Denies cough  Gastrointestinal/Hepatic: Denies nausea, vomiting  Sleep: see HPI    Physical Examination:  Vitals/ General Appearance:   Weight/BMI: Body mass index is 32.85 kg/m².  /78 (BP Location: Left arm, Patient Position: Sitting, BP Cuff Size: Large adult)   Pulse 67   Resp 16   Ht 1.854 m (6' 1\")   Wt 113 kg (249 lb)   SpO2 97%   Vitals:    07/26/23 1239   BP: 126/78   BP Location: Left arm   Patient Position: Sitting   BP Cuff Size: Large adult   Pulse: 67   Resp: 16   SpO2: 97%   Weight: 113 kg (249 lb)   Height: 1.854 m (6' 1\")       Pt. is alert and oriented to time, place and person. Cooperative and in no apparent distress.     Constitutional: Alert, no distress, well-groomed.  Skin: No rashes in visible areas.  Eye: Round. Conjunctiva clear, lids normal. No icterus.   ENT EXAM  Nasal alae/valves collapsible: No   Nasal septum deviation: Yes  Nasal turbinate hypertrophy: Left: Grade 2   Right: Grade 2  Hard palate narrow: Yes  Hard palate high: Yes  Soft palate/uvula (Mallampati score): 4  Tongue Scalloping: No   Retrognathia: No   Micrognathia: No   Cardiovascular:no " murmus/gallops/rubs, normal S1 and S2 heart sounds, regular rate and rhythm  Pulmonary:Clear to auscultation, No wheezes, No crackles.  Neurologic:Awake, alert and oriented x 3, Normal age appropriate gait, No involuntary motions.  Extremities: No clubbing, cyanosis, or edema       ASSESSMENT AND PLAN   Silas Mckeon is a 32 y.o. male with anxiety, daytime fatigue, obesity and obstructive sleep apnea.  Presents to Sleep Clinic for evaluation of sleep.     1. Silas Mckeon  has Obstructive Sleep Apnea (ALAN). Silas Mckeon has symptoms of snoring, choking/gasping during sleep, unrefreshed upon awakening, daytime fatigue. These can interfere with activities of daily living.     Pt has risk factors for ALAN include obesity and crowded oropharynx.     The pathophysiology of ALAN and the increased risk of cardiovascular morbidity from untreated ALAN is discussed in detail with the patient. He  also has anxiety which can be worsened by ALAN.     We have also discussed treatment options including airway pressurization, reconstructive otolaryngologic surgery, dental appliances and weight management.     Subsequently,treatment options will be discussed based on the diagnostic study.  We do not have records of previous sleep study.  Records release signed at today's visit to obtain previous records.    After discussion patient would like to restart CPAP therapy.  Advised that he may buy his machine to have it downloaded to have the pressure change.  We will also reach out to [facility] to see if there is any remote data that can be collected.    Plan  -Restart CPAP therapy  -Advised to [facility] via [facility] or by phone with any questions or concerns.     2.  Chronic Insomnia   With prolonged sleep latency and feeling this is impacting daily functioning for years meets criteria for chronic insomnia. Discussed potential causes of insomnia and importance of having a routine around bedtime.   Discussed cognitive  behavioral therapy for insomnia.  Advised the patient may benefit from participating in self-directed cognitive behavioral therapy for insomnia.  In addition recommended to avoid working out late at night as well as caffeine past noon    RECOMMENDATIONS  -Reference material given regarding cognitive behavioral therapy for insomnia  Advised may benefit from-0.1 to 0.3 mg of melatonin 3 hours before bedtime to try to advance sleep phase.  -Maintain a regular sleep schedule  -Avoid caffeinated beverages after lunch, and alcohol in late afternoon and evening  -Avoid prolonged use of light-emitting screens before bedtime  -Exercise regularly for at least 20 minutes, preferably more than four to five hours prior to bedtime  -Avoid daytime naps, especially if they are longer than 20 to 30 minutes or occur late in the day  -Advised to contact our office or myself with any questions via [employer]      Regarding treatment of other past medical problems and general health maintenance,  Pt is urged to follow up with PCP.    Return in about 3 months (around 10/26/2023).    Please note portions of this record was created using voice recognition software. I have made every reasonable attempt to correct obvious errors, but I expect that there are errors of grammar and possibly content I did not discover before finalizing the note.

## 2023-10-26 ENCOUNTER — OFFICE VISIT (OUTPATIENT)
Dept: SLEEP MEDICINE | Facility: MEDICAL CENTER | Age: 33
End: 2023-10-26
Attending: STUDENT IN AN ORGANIZED HEALTH CARE EDUCATION/TRAINING PROGRAM
Payer: COMMERCIAL

## 2023-10-26 VITALS
HEIGHT: 73 IN | OXYGEN SATURATION: 96 % | SYSTOLIC BLOOD PRESSURE: 110 MMHG | DIASTOLIC BLOOD PRESSURE: 70 MMHG | RESPIRATION RATE: 16 BRPM | HEART RATE: 67 BPM | BODY MASS INDEX: 32.47 KG/M2 | WEIGHT: 245 LBS

## 2023-10-26 DIAGNOSIS — Z78.9 INTOLERANCE OF CONTINUOUS POSITIVE AIRWAY PRESSURE (CPAP) VENTILATION: ICD-10-CM

## 2023-10-26 DIAGNOSIS — G47.33 OSA (OBSTRUCTIVE SLEEP APNEA): Primary | ICD-10-CM

## 2023-10-26 PROCEDURE — 99213 OFFICE O/P EST LOW 20 MIN: CPT | Performed by: STUDENT IN AN ORGANIZED HEALTH CARE EDUCATION/TRAINING PROGRAM

## 2023-10-26 PROCEDURE — 3078F DIAST BP <80 MM HG: CPT | Performed by: STUDENT IN AN ORGANIZED HEALTH CARE EDUCATION/TRAINING PROGRAM

## 2023-10-26 PROCEDURE — 3074F SYST BP LT 130 MM HG: CPT | Performed by: STUDENT IN AN ORGANIZED HEALTH CARE EDUCATION/TRAINING PROGRAM

## 2023-10-26 PROCEDURE — 99212 OFFICE O/P EST SF 10 MIN: CPT | Performed by: STUDENT IN AN ORGANIZED HEALTH CARE EDUCATION/TRAINING PROGRAM

## 2023-10-26 NOTE — PROGRESS NOTES
[facility] Follow-up Visit    CC: Follow-up regarding obstructive sleep apnea      HPI:  Silas Mckeon is a 32 y.o.male  with anxiety, daytime fatigue, insomnia, obesity, and obstructive sleep apnea.  Presents today to follow-up regarding management of obstructive sleep apnea.    He reports he continues to struggle using his CPAP machine.  He has tried a nasal mask and a hybrid style fullface mask and has been able to use it.  He does find after about 4 breaths on the CPAP he feels the pressure is too high and it is difficult to breathe out.  His machine is set to 4 he believes as a minimum pressure.  She did not undergo a titration study.      DME provider: [employer]  Device: CPAP  Mask: Hybrid fullface      Patient Active Problem List    Diagnosis Date Noted    Anxiety 2023    Left lumbosacral radiculopathy 10/14/2015       Past Medical History:   Diagnosis Date    Pain         Past Surgical History:   Procedure Laterality Date    SC NJX AA&/STRD TFRML EPI LUMBAR/SACRAL 1 LEVEL Bilateral 10/14/2015    Procedure: INJ-FORAMEN EPI LUM/SAC SNGL - L5-S1;  Surgeon: Chester Olguin M.D.;  Location: [facility];  Service: Pain Management    SC NJX AA&/STRD TFRML EPI LUMBAR/SACRAL 1 LEVEL  10/14/2015    Procedure: INJ-FORAMEN EPI LUM/SAC SNGL;  Surgeon: Chester Olguin M.D.;  Location: [facility];  Service: Pain Management    OTHER ORTHOPEDIC SURGERY      hip surgery        History reviewed. No pertinent family history.    Social History     Socioeconomic History    Marital status: Single     Spouse name: Not on file    Number of children: Not on file    Years of education: Not on file    Highest education level: Not on file   Occupational History    Not on file   Tobacco Use    Smoking status: Former     Current packs/day: 0.00     Types: Cigarettes     Quit date: 2015     Years since quittin.5    Smokeless tobacco: Never   Vaping Use    Vaping Use: Never used  "  Substance and Sexual Activity    Alcohol use: Not Currently     Comment: occassion    Drug use: No    Sexual activity: Not on file   Other Topics Concern    Not on file   Social History Narrative    Not on file     Social Determinants of Health     Financial Resource Strain: Not on file   Food Insecurity: Not on file   Transportation Needs: Not on file   Physical Activity: Not on file   Stress: Not on file   Social Connections: Not on file   Intimate Partner Violence: Not on file   Housing Stability: Not on file       Current Outpatient Medications   Medication Sig Dispense Refill    traZODone (DESYREL) 100 MG Tab       FLUoxetine (PROZAC) 20 MG Cap TAKE 1 TABLET ORAL EVERY AM OR DEPRESSION/ANXIETY DISP #30 TABS FOR 30 DAYS      LORazepam (ATIVAN) 0.5 MG Tab TAKE 1 TABLET BY MOUTH EVERY DAY AS NEEDED FOR ANXIETY FOR 30 DAYS      celecoxib (CELEBREX) 200 MG Cap Take 200 mg by mouth.  5    hydrOXYzine HCl (ATARAX) 50 MG Tab TAKE 1 TABLET ORAL DAILY AS NEEDED FOR ANXIETY DISP#30 TABS FOR 30 DAYS (Patient not taking: Reported on 6/6/2023)       No current facility-administered medications for this visit.        ALLERGIES: Patient has no known allergies.    ROS  Constitutional: Denies fevers, Denies weight changes  Ears/Nose/Throat/Mouth: Denies nasal congestion or sore throat   Cardiovascular: Denies chest pain  Respiratory: Denies shortness of breath, Denies cough  Gastrointestinal/Hepatic: Denies nausea, vomiting  Sleep: see HPI      PHYSICAL EXAM  /70 (BP Location: Left arm, Patient Position: Sitting, BP Cuff Size: Large adult)   Pulse 67   Resp 16   Ht 1.854 m (6' 1\")   Wt 111 kg (245 lb)   SpO2 96%   BMI 32.32 kg/m²   Appearance: Well-nourished, well-developed, no acute distress  Eyes:  No scleral icterus , EOMI  Musculoskeletal:  Grossly normal; gait and station normal; digits and nails normal  Skin:  No rashes, petechiae, cyanosis  Neurologic: without focal signs; oriented to person, time, place, " and purpose; judgement intact      Medical Decision Making   Assessment and Plan  Silas Mckeon is a 32 y.o.male  with anxiety, daytime fatigue, insomnia, obesity, and obstructive sleep apnea.  Presents today to follow-up regarding management of obstructive sleep apnea.    The medical record was reviewed.    Obstructive sleep apnea  We do not have remote access to his CPAP machine.  Patient reports he has not been using it consistently.  Since receiving his machine he is maybe had 2 nights where he is able to fully wear it and did notice symptomatic benefit.  His main complaint is the fact that he cannot breathe out against the machine.  Because of this he would benefit from undergoing a PAP titration study.    He would also like to try a traditional fullface mask.  Order sent in for mask supply.    PLAN:   -Order placed for PSG titration study  -Order placed for mask fitting  -Advised to reach out via MyChart with questions     Has been advised to continue the current CPAP, clean equipment frequently, and get new mask and supplies as allowed by insurance and DME. Recommend an earlier appointment, if significant treatment barriers develop.    Patients with ALAN are at increased risk of cardiovascular disease including coronary artery disease, systemic arterial hypertension, pulmonary arterial hypertension, cardiac arrythmias, and stroke. The patient was advised to avoid driving a motor vehicle when drowsy.    Positive airway pressure will favorably impact many of the adverse conditions and effects provoked by ALAN.    Have advised the patient to follow up with the appropriate healthcare practitioners for all other medical problems and issues.    Return for after sleep study.      Please note portions of this record was created using voice recognition software. I have made every reasonable attempt to correct obvious errors, but I expect that there are errors of grammar and possibly content I did not discover  before finalizing the note.

## 2023-11-27 ENCOUNTER — TELEPHONE (OUTPATIENT)
Dept: HEALTH INFORMATION MANAGEMENT | Facility: OTHER | Age: 33
End: 2023-11-27
Payer: COMMERCIAL

## 2024-01-29 ENCOUNTER — TELEPHONE (OUTPATIENT)
Dept: SLEEP MEDICINE | Facility: MEDICAL CENTER | Age: 34
End: 2024-01-29
Payer: COMMERCIAL

## 2024-01-29 NOTE — TELEPHONE ENCOUNTER
01-29-24  3rd NO SHOW  Date of No Show: 1/26/24  Provider: Dr. Castellon   Reason For Visit: FV/SS Results  Outcome of call: Sent to leadership

## 2024-12-24 ENCOUNTER — HOSPITAL ENCOUNTER (OUTPATIENT)
Dept: LAB | Facility: MEDICAL CENTER | Age: 34
End: 2024-12-24
Attending: FAMILY MEDICINE
Payer: COMMERCIAL

## 2024-12-24 LAB — RHEUMATOID FACT SER IA-ACNC: <10 IU/ML (ref 0–14)

## 2024-12-24 PROCEDURE — 86812 HLA TYPING A B OR C: CPT

## 2024-12-24 PROCEDURE — 86431 RHEUMATOID FACTOR QUANT: CPT

## 2024-12-24 PROCEDURE — 86038 ANTINUCLEAR ANTIBODIES: CPT

## 2024-12-24 PROCEDURE — 86617 LYME DISEASE ANTIBODY: CPT

## 2024-12-24 PROCEDURE — 36415 COLL VENOUS BLD VENIPUNCTURE: CPT

## 2024-12-24 PROCEDURE — 86200 CCP ANTIBODY: CPT

## 2024-12-27 LAB
B BURGDOR IGG SER QL IB: NEGATIVE
B BURGDOR IGM SER QL IB: NEGATIVE
CCP IGA+IGG SERPL IA-ACNC: 3 UNITS (ref 0–19)
HLA-B27 QL FC: NEGATIVE
NUCLEAR IGG SER QL IA: NORMAL

## 2025-04-01 ENCOUNTER — HOSPITAL ENCOUNTER (OUTPATIENT)
Dept: LAB | Facility: MEDICAL CENTER | Age: 35
End: 2025-04-01
Attending: FAMILY MEDICINE
Payer: COMMERCIAL

## 2025-04-01 LAB — ERYTHROCYTE [SEDIMENTATION RATE] IN BLOOD BY WESTERGREN METHOD: 8 MM/HOUR (ref 0–20)

## 2025-04-01 PROCEDURE — 85652 RBC SED RATE AUTOMATED: CPT

## 2025-08-07 ENCOUNTER — APPOINTMENT (OUTPATIENT)
Dept: NEUROLOGY | Facility: MEDICAL CENTER | Age: 35
End: 2025-08-07
Attending: STUDENT IN AN ORGANIZED HEALTH CARE EDUCATION/TRAINING PROGRAM
Payer: COMMERCIAL